# Patient Record
Sex: MALE | Race: WHITE | URBAN - METROPOLITAN AREA
[De-identification: names, ages, dates, MRNs, and addresses within clinical notes are randomized per-mention and may not be internally consistent; named-entity substitution may affect disease eponyms.]

---

## 2017-01-20 ENCOUNTER — TRANSFERRED RECORDS (OUTPATIENT)
Dept: HEALTH INFORMATION MANAGEMENT | Facility: CLINIC | Age: 42
End: 2017-01-20

## 2018-02-02 ENCOUNTER — RADIANT APPOINTMENT (OUTPATIENT)
Dept: GENERAL RADIOLOGY | Facility: CLINIC | Age: 43
End: 2018-02-02
Attending: ORTHOPAEDIC SURGERY
Payer: OTHER MISCELLANEOUS

## 2018-02-02 ENCOUNTER — OFFICE VISIT (OUTPATIENT)
Dept: ORTHOPEDICS | Facility: CLINIC | Age: 43
End: 2018-02-02
Payer: OTHER MISCELLANEOUS

## 2018-02-02 VITALS — BODY MASS INDEX: 29.82 KG/M2 | WEIGHT: 213 LBS | TEMPERATURE: 97.3 F | HEIGHT: 71 IN

## 2018-02-02 DIAGNOSIS — M25.561 RIGHT KNEE PAIN: ICD-10-CM

## 2018-02-02 DIAGNOSIS — Z98.890 S/P ACL RECONSTRUCTION: Primary | ICD-10-CM

## 2018-02-02 PROCEDURE — 99203 OFFICE O/P NEW LOW 30 MIN: CPT | Mod: 25 | Performed by: ORTHOPAEDIC SURGERY

## 2018-02-02 PROCEDURE — 20610 DRAIN/INJ JOINT/BURSA W/O US: CPT | Mod: RT | Performed by: ORTHOPAEDIC SURGERY

## 2018-02-02 PROCEDURE — 73564 X-RAY EXAM KNEE 4 OR MORE: CPT | Mod: TC

## 2018-02-02 RX ORDER — IBUPROFEN 200 MG
TABLET ORAL
COMMUNITY

## 2018-02-02 RX ORDER — TRIAMCINOLONE ACETONIDE 40 MG/ML
40 INJECTION, SUSPENSION INTRA-ARTICULAR; INTRAMUSCULAR ONCE
Qty: 1 ML | Refills: 0
Start: 2018-02-02 | End: 2018-02-02

## 2018-02-02 RX ORDER — DULOXETIN HYDROCHLORIDE 60 MG/1
60 CAPSULE, DELAYED RELEASE ORAL
COMMUNITY
Start: 2018-02-02 | End: 2018-05-03

## 2018-02-02 ASSESSMENT — PAIN SCALES - GENERAL: PAINLEVEL: MILD PAIN (3)

## 2018-02-02 NOTE — PROGRESS NOTES
"ORTHOPEDIC CONSULT      Chief Complaint: Omid Sherman is a 42 year old male who is being seen for   Chief Complaint   Patient presents with     Knee Pain     right knee pain     Consult       History of Present Illness:   Omid Sherman is a 42 year old male who is seen in consultation at the request of self for evaluation of right knee pain/2nd opinion from [facility]..  Mechanism of Injury: Had ACL reconstruction on July of 2016 at outside facility. This was due to a workplace injury were a large boulder rolled down a hill, struck his leg, went over his foot causing a ACL injury.  Since the surgery he said he has gotten about 50% within the first few months, then hit a plateau since then and has not improved.  States his biggest concerns, complaints are that he feels he is limited with running, jumping, physical activity and kneeling especially.  States that pain limits these activities.  After surgery he states did 12 weeks of PT then hit a max improvement and was graduated from PT.  Has not had PT within the last year.  Last saw his original surgeon in Sept. Of 2017.  Per patient he told him he was as good as he was going to get and signed off.  Per patient he has tried bracing after the surgery but not recently.  Does not take medicine.  He is concerned that he feels occasionally \"loose\" to the knee.  Denies any numbness/tingling or shooting symptoms.  States pain is medial and a U shape of medial to distal anterior knee to lateral and states the pain is similar as to prior to surgery.  Overall feels 50% better. Here for 2nd opinion.       Surgery per the surgeon report: 1: Right knee diagnostic arthroscopy  2: right knee partial medial meniscectomy   3: anterior cruciate ligament reconstruction with patellar tendon autograft.    Patient's past medical, surgical, social and family histories reviewed.     Medial history reviewed: states in good health.  Did have a previous episode of Afib in 2014 " "that resolved spontaneous    Surgical history reviewed:   Right knee ACL reconstruction July 2016.       Medications:  States takes cymbalta 60mg once a day    Not on File    Social History     Occupational History     Not on file.     Social History Main Topics     Smoking status: Never Smoker     Smokeless tobacco: Never Used     Alcohol use Not on file     Drug use: Not on file     Sexual activity: Not on file       History reviewed. No pertinent family history.    REVIEW OF SYSTEMS  10 point review systems performed otherwise negative as noted as per history of present illness.    Physical Exam:  Vitals: Temp 97.3  F (36.3  C) (Temporal)  Ht 1.791 m (5' 10.5\")  Wt 96.6 kg (213 lb)  BMI 30.13 kg/m2  BMI= Body mass index is 30.13 kg/(m^2).  Constitutional: healthy, alert and no acute distress   Psychiatric: mentation appears normal and affect normal/bright  NEURO: no focal deficits  RESP: Normal with easy respirations and no use of accessory muscles to breathe, no audible wheezing or retractions  CV: RLE: no edema         Regular rate and rhythm by palpation  SKIN: No erythema, rashes, excoriation, or breakdown. No evidence of infection.  Previous well healed/well approximated incision to anterior knee consistent with reported ACL reconstruction surgery.  JOINT/EXTREMITIES:right Knee Exam: Inspection: AP/lateral alignment normal, No effusion  Tender: medial joint line  Non-tender: quadriceps insertion, MCL, LCL  Active Range of Motion: full flexion, no pain with flexion, full extension, no pain with extension  Strength: good muscle tone to quad but mildly decreased compared to left, hip flexion equal left to right  Special tests: normal Valgus stress test, normal Varus, negative Lachman's test, negative Guille's , no apprehension with lateral stress of the patella  No instability or laxity felt to ACL   Lymph: no appreciated lymphedema  GAIT: antalgic    Diagnostic Modalities:  right knee X-ray: No fracture, " dislocation and or lesion. Normal alignment.  Joint space maintained no significant arthritis.  Small round calcification on medial epicondyl.  Hardware apparent, consistent with previous ACL reconstruction.  No apparent abnormality with hardware.   Independent visualization of the images was performed.      Impression: right knee pain - previous ACL reconstruction 1.5 years ago with patellar tendon autograft      Plan:  All of the above pertinent physical exam and imaging modalities findings was reviewed with Omid and his spouse.      Imaging and exam was unremarkable.  The pain with kneeling may be related to autograft from the patellar tendon.  The other pain that limits physical activity may be related to knee deconditioning.  Discussed various options and would like to start with steroid injection and physical therapy.    I recommend conservative care for the patient to include formal physical therapy, steroid injections. Today I provided or dispensed physical therapy.  The patient was counseled about an  injection, including discussion of risks (including infection), contents of the injection, rationale for performing the injection, and expected benefits of the injection. The skin was prepped with alcohol and betadine and then utilizing sterile technique an injection of the right knee joint from the anterolateral approach in the seated position was performed. The injection consisted 1ml of Kenalog (40mg per 1 ml) mixed with 3ml of 0.5% Marcaine. The patient tolerated the injection well, and there were no complications. The injection site was covered with a Band-Aid. The injection was performed by MARC Álvarez, CNP, DNP    Discussed red flag signs and symptoms following a steroid injection and when to return to clinic including with concerns of infection or negative reaction to injection        Return to clinic 4-6  Week(s) if not better, PRN, or sooner as needed for changes.  Re-x-ray on return:  No    Scribed by:  Mitesh Cruz, APRN, CNP, DNP  2:53 PM  2/2/2018    I attest I have seen and evaluated the patient.  I agree with above impression and plan.  Primo Dela Cruz D.O.

## 2018-02-02 NOTE — NURSING NOTE
"Chief Complaint   Patient presents with     Knee Pain     right knee pain     Consult       Initial Temp 97.3  F (36.3  C) (Temporal)  Ht 1.791 m (5' 10.5\")  Wt 96.6 kg (213 lb)  BMI 30.13 kg/m2 Estimated body mass index is 30.13 kg/(m^2) as calculated from the following:    Height as of this encounter: 1.791 m (5' 10.5\").    Weight as of this encounter: 96.6 kg (213 lb).  Medication Reconciliation: complete   RAMESH Powell  "

## 2018-02-02 NOTE — MR AVS SNAPSHOT
"              After Visit Summary   2018    Omid Sherman    MRN: [id number]           Patient Information     Date Of Birth          1975        Visit Information        Provider Department      2018 2:00 PM David Dela Cruz,  [facility]        Today's Diagnoses     Right knee pain    -  1       Follow-ups after your visit        Who to contact     If you have questions or need follow up information about today's clinic visit or your schedule please contact [facility] directly at [phone].  Normal or non-critical lab and imaging results will be communicated to you by [employer], letter or phone within 4 business days after the clinic has received the results. If you do not hear from us within 7 days, please contact the clinic through [employer] or phone. If you have a critical or abnormal lab result, we will notify you by phone as soon as possible.  Submit refill requests through [employer] or call your pharmacy and they will forward the refill request to us. Please allow 3 business days for your refill to be completed.          Additional Information About Your Visit        MyChart Information     [employer] lets you send messages to your doctor, view your test results, renew your prescriptions, schedule appointments and more. To sign up, go to [email or web address] . Click on \"Log in\" on the left side of the screen, which will take you to the Welcome page. Then click on \"Sign up Now\" on the right side of the page.     You will be asked to enter the access code listed below, as well as some personal information. Please follow the directions to create your username and password.     Your access code is: [email or web address]  Expires: 5/3/2018  2:08 PM     Your access code will  in 90 days. If you need help or a new code, please call your [facility] or [phone].        Care EveryWhere ID     This is your Care EveryWhere ID. This could be used by other " "organizations to access your [facility] medical records  [id number]        Your Vitals Were     Temperature Height BMI (Body Mass Index)             97.3  F (36.3  C) (Temporal) 1.791 m (5' 10.5\") 30.13 kg/m2          Blood Pressure from Last 3 Encounters:   No data found for BP    Weight from Last 3 Encounters:   02/02/18 96.6 kg (213 lb)               Primary Care Provider Office Phone # Fax #    [facility] [phone]       [address] [place] [address] [address]  [facility] [id number]        Equal Access to Services     MARY MAJOR : Hadii aad ku hadasho Soquentin, waaxda luqadaha, qaybta kaalmada adejorgito, lee ann sloan. So [facility] [phone].    ATENCIÓN: Si habla español, tiene a bear disposición servicios gratuitos de asistencia lingüística. Llame al [phone].    We comply with applicable federal civil rights laws and [place] laws. We do not discriminate on the basis of race, color, national origin, age, disability, sex, sexual orientation, or gender identity.            Thank you!     Thank you for choosing [facility]  for your care. Our goal is always to provide you with excellent care. Hearing back from our patients is one way we can continue to improve our services. Please take a few minutes to complete the written survey that you may receive in the mail after your visit with us. Thank you!             Your Updated Medication List - Protect others around you: Learn how to safely use, store and throw away your medicines at www.disposemymeds.org.          This list is accurate as of 2/2/18  2:08 PM.  Always use your most recent med list.                   Brand Name Dispense Instructions for use Diagnosis    ADVIL 200 MG tablet   Generic drug:  ibuprofen           DULoxetine 60 MG EC capsule    CYMBALTA     Take 60 mg by mouth          "

## 2018-02-02 NOTE — LETTER
"    2/2/2018         RE: Omid Sherman  [address]  [facility] [address]        Dear Colleague,    Thank you for referring your patient, Omid Sherman, to the [facility]. Please see a copy of my visit note below.    ORTHOPEDIC CONSULT      Chief Complaint: Omid Sherman is a 42 year old male who is being seen for   Chief Complaint   Patient presents with     Knee Pain     right knee pain     Consult       History of Present Illness:   Omid Sherman is a 42 year old male who is seen in consultation at the request of self for evaluation of right knee pain/2nd opinion from [facility]..  Mechanism of Injury: Had ACL reconstruction on July of 2016 at outside facility. This was due to a workplace injury were a large boulder rolled down a hill, struck his leg, went over his foot causing a ACL injury.  Since the surgery he said he has gotten about 50% within the first few months, then hit a plateau since then and has not improved.  States his biggest concerns, complaints are that he feels he is limited with running, jumping, physical activity and kneeling especially.  States that pain limits these activities.  After surgery he states did 12 weeks of PT then hit a max improvement and was graduated from PT.  Has not had PT within the last year.  Last saw his original surgeon in Sept. Of 2017.  Per patient he told him he was as good as he was going to get and signed off.  Per patient he has tried bracing after the surgery but not recently.  Does not take medicine.  He is concerned that he feels occasionally \"loose\" to the knee.  Denies any numbness/tingling or shooting symptoms.  States pain is medial and a U shape of medial to distal anterior knee to lateral and states the pain is similar as to prior to surgery.  Overall feels 50% better. Here for 2nd opinion.       Surgery per the surgeon report: 1: Right knee diagnostic arthroscopy  2: right knee partial medial meniscectomy   3: anterior " "cruciate ligament reconstruction with patellar tendon autograft.    Patient's past medical, surgical, social and family histories reviewed.     Medial history reviewed: states in good health.  Did have a previous episode of Afib in 2014 that resolved spontaneous    Surgical history reviewed:   Right knee ACL reconstruction July 2016.       Medications:  States takes cymbalta 60mg once a day    Not on File    Social History     Occupational History     Not on file.     Social History Main Topics     Smoking status: Never Smoker     Smokeless tobacco: Never Used     Alcohol use Not on file     Drug use: Not on file     Sexual activity: Not on file       History reviewed. No pertinent family history.    REVIEW OF SYSTEMS  10 point review systems performed otherwise negative as noted as per history of present illness.    Physical Exam:  Vitals: Temp 97.3  F (36.3  C) (Temporal)  Ht 1.791 m (5' 10.5\")  Wt 96.6 kg (213 lb)  BMI 30.13 kg/m2  BMI= Body mass index is 30.13 kg/(m^2).  Constitutional: healthy, alert and no acute distress   Psychiatric: mentation appears normal and affect normal/bright  NEURO: no focal deficits  RESP: Normal with easy respirations and no use of accessory muscles to breathe, no audible wheezing or retractions  CV: RLE: no edema         Regular rate and rhythm by palpation  SKIN: No erythema, rashes, excoriation, or breakdown. No evidence of infection.  Previous well healed/well approximated incision to anterior knee consistent with reported ACL reconstruction surgery.  JOINT/EXTREMITIES:right Knee Exam: Inspection: AP/lateral alignment normal, No effusion  Tender: medial joint line  Non-tender: quadriceps insertion, MCL, LCL  Active Range of Motion: full flexion, no pain with flexion, full extension, no pain with extension  Strength: good muscle tone to quad but mildly decreased compared to left, hip flexion equal left to right  Special tests: normal Valgus stress test, normal Varus, negative " Lachman's test, negative Guilel's , no apprehension with lateral stress of the patella  No instability or laxity felt to ACL   Lymph: no appreciated lymphedema  GAIT: antalgic    Diagnostic Modalities:  right knee X-ray: No fracture, dislocation and or lesion. Normal alignment.  Joint space maintained no significant arthritis.  Small round calcification on medial epicondyl.  Hardware apparent, consistent with previous ACL reconstruction.  No apparent abnormality with hardware.   Independent visualization of the images was performed.      Impression: right knee pain - previous ACL reconstruction 1.5 years ago with patellar tendon autograft      Plan:  All of the above pertinent physical exam and imaging modalities findings was reviewed with Omid and his spouse.      Imaging and exam was unremarkable.  The pain with kneeling may be related to autograft from the patellar tendon.  The other pain that limits physical activity may be related to knee deconditioning.  Discussed various options and would like to start with steroid injection and physical therapy.    I recommend conservative care for the patient to include formal physical therapy, steroid injections. Today I provided or dispensed physical therapy.  The patient was counseled about an  injection, including discussion of risks (including infection), contents of the injection, rationale for performing the injection, and expected benefits of the injection. The skin was prepped with alcohol and betadine and then utilizing sterile technique an injection of the right knee joint from the anterolateral approach in the seated position was performed. The injection consisted 1ml of Kenalog (40mg per 1 ml) mixed with 3ml of 0.5% Marcaine. The patient tolerated the injection well, and there were no complications. The injection site was covered with a Band-Aid. The injection was performed by Mitesh Cruz, APRN, CNP, DNP    Discussed red flag signs and symptoms following a  steroid injection and when to return to clinic including with concerns of infection or negative reaction to injection        Return to clinic 4-6  Week(s) if not better, PRN, or sooner as needed for changes.  Re-x-ray on return: No    Scribed by:  Mitesh Cruz, APRN, CNP, DNP  2:53 PM  2/2/2018    I attest I have seen and evaluated the patient.  I agree with above impression and plan.  Primo Dela Cruz D.O.    Again, thank you for allowing me to participate in the care of your patient.        Sincerely,        David Dela Cruz, DO

## 2018-02-08 ENCOUNTER — TELEPHONE (OUTPATIENT)
Dept: ORTHOPEDICS | Facility: CLINIC | Age: 43
End: 2018-02-08

## 2018-02-12 ENCOUNTER — HOSPITAL ENCOUNTER (OUTPATIENT)
Dept: PHYSICAL THERAPY | Facility: OTHER | Age: 43
Setting detail: THERAPIES SERIES
End: 2018-02-12
Attending: ORTHOPAEDIC SURGERY
Payer: OTHER MISCELLANEOUS

## 2018-02-12 PROCEDURE — 97110 THERAPEUTIC EXERCISES: CPT | Mod: GP | Performed by: PHYSICAL THERAPIST

## 2018-02-12 PROCEDURE — 40000718 ZZHC STATISTIC PT DEPARTMENT ORTHO VISIT: Performed by: PHYSICAL THERAPIST

## 2018-02-12 PROCEDURE — 97161 PT EVAL LOW COMPLEX 20 MIN: CPT | Mod: GP | Performed by: PHYSICAL THERAPIST

## 2018-02-12 NOTE — PROGRESS NOTES
02/12/18 0900   Lower Extremity Functional Scale ( 1996 ELIANE Martines: used with permission)   a. Any of your usual work, housework, or school activities. 2-Moderate Difficulty   b. Your usual hobbies, recreational or sporting activities.  2-Moderate Difficulty   c. Getting into or out of the bath. 3-A Little Bit of Difficulty   d. Walking between rooms. 4-No Difficulty   e. Putting on your shoes or socks. 3-A Little Bit of Difficulty   f. Squatting. 2-Moderate Difficulty   g. Lifting an object, like a bag of groceries from the floor.  3-A Little Bit of Difficulty   h. Performing light activities around your home.  3-A Little Bit of Difficulty   i. Performing heavy activities around your home. 2-Moderate Difficulty   j. Getting into or out of a car. 3-A Little Bit of Difficulty   k. Walking 2 blocks. 4-No Difficulty   l. Walking a mile. 3-A Little Bit of Difficulty   m. Going up or down 10 stairs (about 1 flight of stairs). 2-Moderate Difficulty   n. Standing for 1 hour. 3-A Little Bit of Difficulty   o. Sitting for 1 hour. 4-No Difficulty   p. Running on even ground. 2-Moderate Difficulty   q. Running on uneven ground. 2-Moderate Difficulty   r. Making sharp turns while running fast. 2-Moderate Difficulty   s. Hopping. 2-Moderate Difficulty   t. Rolling over in bed. 4-No Difficulty   SCORE:    Column Totals: /80 55

## 2018-02-12 NOTE — PROGRESS NOTES
02/12/18 0931   General Information   Type of Visit Initial OP Ortho PT Evaluation   Start of Care Date 02/12/18   Referring Physician erendira mathew DO   Patient/Family Goals Statement knee rehab   Orders Evaluate and Treat   Date of Order 02/02/18   Insurance Type Other  (work comp )   Medical Diagnosis s/p right ACL 7/2016 Z 98.890   Surgical/Medical history reviewed Yes   Precautions/Limitations no known precautions/limitations   Weight-Bearing Status - RLE full weight-bearing   Body Part(s)   Body Part(s) Knee   Presentation and Etiology   Pertinent history of current problem (include personal factors and/or comorbidities that impact the POC) patient seen due to right knee pain and decrease function . he injured his knee at work may 15 2016 when a boulder rolled onto his knee , had surgery july 18 2016 for ACL reconstruction using patellar graft . had 3 months of therapy and went back for 1 year follow up and is not doing as well as he would like , dr cano told him he would likely get arthriits in future and may not kneel again . went to dr mathew for 2nd opinion and he referred patient to PT . PMH none reported , currently is unable to kneel on right knee due to pain , cant jump  or run due to pain , pior to injury able to run was running 10 miles a week and able to jump has been unable to run or jump since injury .and has trouble doing stairs . 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 Pain;F. Decreased strength and endurance   Functional Limitations perform activities of daily living;perform desired leisure / sports activities   Symptom Location right knee    How/Where did it occur At work   Onset date of current episode/exacerbation (may 15 2016)   Chronicity Chronic   Pain rating (0-10 point scale) Best (/10);Worst (/10)   Best (/10) 3/10   Worst (/10) 4/10   Pain quality B. Dull;C. Aching   Frequency of pain/symptoms A. Constant   Pain/symptoms exacerbated by L.  Work tasks  (kneel , throttle , driving , stairs)   Pain/symptoms eased by C. Rest;E. Changing positions;F. Certain positions   Progression of symptoms since onset: Improved   Prior Level of Function   Functional Level Prior Comment cut [place] and house work    Current Level of Function   Current Community Support Family/friend caregiver   Patient role/employment history A. Employed   Fall Risk Screen   Fall screen completed by PT   Have you fallen 2 or more times in the past year? No   Have you fallen and had an injury in the past year? No   Is patient a fall risk? No   Knee Objective Findings   Side (if bilateral, select both right and left) Right   Knee ROM Comment AROM left 0-130 right 0-120    Knee/Hip Strength Comments girth at 6 in right 18 1/2 left 18 at 8 inch right 20 1/2 left 20 1/4 at 10 inch right 21 3/4 left 21 3/4    Anterior Drawer Test neg   Posterior Drawer Test neg   Varus Stress Test neg   Valgus Stress Test neg   Palpation anterior knee pain    Right Hamstring Flexibility right 37 left 25    Clinical Impression   Criteria for Skilled Therapeutic Interventions Met yes, treatment indicated   PT Diagnosis s/p right ACL july 2016   Functional limitations due to impairments LEFS 55/80   Clinical Presentation Stable/Uncomplicated   Clinical Decision Making (Complexity) Low complexity   Predicted Duration of Therapy Intervention (days/wks) every other week to every week x 3 months 10 Rx over 3 months    Risk & Benefits of therapy have been explained Yes   Patient, Family & other staff in agreement with plan of care Yes   Clinical Impression Comments patient seen s/p right ACL july 2016 , presents with pain , and decrease AROM and flexability and strength / balance , Rx is exercises in clinic and progression of HEP    Education Assessment   Preferred Learning Style Listening;Reading;Demonstration   Barriers to Learning No barriers   ORTHO GOALS   PT Ortho Eval Goals 1;2;3   Ortho Goal 1   Goal Identifier  1   Goal Description patient to have equal flexability with hamstring and heelcord    Target Date 05/12/18   Ortho Goal 2   Goal Identifier 2   Goal Description patient to have improved tolerance to activity currently  55/80 goal is 65/10   Target Date 05/12/18   Ortho Goal 3   Goal Identifier 3   Goal Description patient to have reduction in pain level currently 3-4/10 goal is 1-3/10   Target Date 05/12/18   Total Evaluation Time   Total Evaluation Time 15

## 2018-03-12 NOTE — ADDENDUM NOTE
Encounter addended by: Cheli Alcala, PT on: 3/12/2018 10:03 AM<BR>     Actions taken: Episode resolved, Sign clinical note

## 2018-03-12 NOTE — PROGRESS NOTES
Outpatient Physical Therapy Discharge Note     Patient: Omid Sherman  : 1975    Beginning/End Dates of Reporting Period:  2018 to 3/12/2018    Referring Provider: erendira Denson Diagnosis: knee pain      Client Self Report:      Objective Measurements:  Objective Measure: pain 3-4/10 LEFS 55/80  Patient seen for evaluation and instruction in HEP only he had follow up on 2018 and cancelled as he returned to work and as of 3/12/18 he has made no further contact with PT       Goals:  Goal Identifier 1   Goal Description patient to have equal flexability with hamstring and heelcord    Target Date 18   Date Met      Progress:     Goal Identifier 2   Goal Description patient to have improved tolerance to activity currently  55/80 goal is 65/10   Target Date 18   Date Met      Progress:     Goal Identifier 3   Goal Description patient to have reduction in pain level currently 3-4/10 goal is 1-3/10   Target Date 18   Date Met      Progress:       Progress Toward Goals:   Progress this reporting period: has not followed back with PT           Plan:  Discharge from therapy.    Discharge:    Reason for Discharge: No further expectation of progress.  Patient has failed to schedule further appointments.    Equipment Issued: none    Discharge Plan: Patient to continue home program.